# Patient Record
Sex: MALE | Race: WHITE | Employment: FULL TIME | RURAL
[De-identification: names, ages, dates, MRNs, and addresses within clinical notes are randomized per-mention and may not be internally consistent; named-entity substitution may affect disease eponyms.]

---

## 2018-09-12 ENCOUNTER — OFFICE VISIT (OUTPATIENT)
Dept: CARDIOLOGY CLINIC | Age: 28
End: 2018-09-12

## 2018-09-12 VITALS
SYSTOLIC BLOOD PRESSURE: 128 MMHG | OXYGEN SATURATION: 98 % | HEIGHT: 72 IN | BODY MASS INDEX: 34.4 KG/M2 | DIASTOLIC BLOOD PRESSURE: 86 MMHG | RESPIRATION RATE: 18 BRPM | HEART RATE: 74 BPM | WEIGHT: 254 LBS

## 2018-09-12 DIAGNOSIS — Z72.0 TOBACCO USE: ICD-10-CM

## 2018-09-12 DIAGNOSIS — R06.02 SOB (SHORTNESS OF BREATH): ICD-10-CM

## 2018-09-12 DIAGNOSIS — Z82.49 FH: CAD (CORONARY ARTERY DISEASE): Primary | ICD-10-CM

## 2018-09-12 NOTE — PROGRESS NOTES
Jimbo Orr is a 34 y.o. male is here requesting cardiac evaluation. +FH early CAD--father had first MI age 29, subsequent problems and eventual CABG, paternal GF wtih heart disease. No hx DM, hypertension, dyslpidemia, RF, heart murmur, valvular, CHF. +tobacco, occas smoking, chews. Physically active, no CV sx or complaints. Seen by PCP 8/27/18 for physical and requested CV evaluation. EKG 8/27 with NSR, normal EKG. Normal physical exam.  Labs 8/27/18 [id number], LDL 68. HDL 42, TG 58, CMP normal (gluc 80, BUN 18, Cr 1.04, K 5.0), CBC normal (Hb 14.2). No CV sx or complaints, physically active. The patient denies chest pain/ shortness of breath, orthopnea, PND, LE edema, palpitations, syncope, presyncope or fatigue. Patient Active Problem List  
 Diagnosis Date Noted  FH: CAD (coronary artery disease) 09/12/2018  Tobacco use 09/12/2018 No primary care provider on file. History reviewed. No pertinent past medical history. History reviewed. No pertinent surgical history. Allergies not on file Family History Problem Relation Age of Onset  Coronary Artery Disease Father 29  
  heart attack age 26  
 Heart Disease Father  Heart Disease Paternal Grandfather Social History Social History  Marital status: SINGLE Spouse name: N/A  
 Number of children: N/A  
 Years of education: N/A Occupational History  Not on file. Social History Main Topics  Smoking status: Not on file  Smokeless tobacco: Not on file  Alcohol use Not on file  Drug use: Not on file  Sexual activity: Not on file Other Topics Concern  Not on file Social History Narrative  No narrative on file No current outpatient prescriptions on file. No current facility-administered medications for this visit. Review of Symptoms: 
 
CONST  No weight change. No fever, chills, sweats ENT No visual changes, URI sx, sore throat CV  See HPI  
 RESP  No cough, or sputum, wheezing. Also see HPI  
GI  No abdominal pain or change in bowel habits. No heartburn or dysphagia. No melena or rectal bleeding.   No dysuria, urgency, frequency, hematuria MSKEL  No joint pain, swelling. No muscle pain. SKIN  No rash or lesions. NEURO  No headache, syncope, or seizure. No weakness, loss of sensation, or paresthesias. PSYCH  No low mood or depression No anxiety. HE/LYMPH  No easy bruising, abnormal bleeding, or enlarged glands. Physical ExamPhysical Exam:   
There were no vitals taken for this visit. Gen: NAD HEENT:  PERRL, throat clear Neck: no adenopathy, no thyromegaly, no JVD Heart:  Regular,Nl S1S2,  no murmur, gallop or rub.  
Lungs:  clear Abdomen:   Soft, non-tender, bowel sounds are active.  
Extremities:  No edema Pulse: symmetric Neuro: A&O times 3, No focal neuro deficits Cardiographics ECG: normal EKG, normal sinus rhythm, unchanged from previous tracings from outside office Assessment:  
  
  
Patient Active Problem List  
 Diagnosis Date Noted  FH: CAD (coronary artery disease) 09/12/2018  Tobacco use 09/12/2018  
 
34 y.o. male is here requesting cardiac evaluation. +FH early CAD--father had first MI age 29, subsequent problems and eventual CABG, paternal GF wtih heart disease. No hx DM, hypertension, dyslpidemia, RF, heart murmur, valvular, CHF. +tobacco, occas smoking, chews. Physically active, no CV sx or complaints. Seen by PCP 8/27/18 for physical and requested CV evaluation--was told he should see Cardiology after age 27, this is his first evaluation. EKG 8/27 with NSR, normal EKG. Normal physical exam (/80, previous 118/74). Labs 8/27/18 [id number], LDL 68. HDL 42, TG 58, CMP normal (gluc 80, BUN 18, Cr 1.04, K 5.0), CBC normal (Hb 14.2). No CV sx or complaints, physically active.   
 
 Plan:  
 
Lengthy discussion about CAD, risks, plaque development, etc 
 Recommend Heart Scan--EBCT calcium scoring to r/o any CAD--call w/ results (if \"0\" ok without further testing) RF modification--nicotine, limit ETOH Labs reviewed, diet Karla Ramos MD

## 2018-09-12 NOTE — MR AVS SNAPSHOT
[address] [place] [id number] [phone] Patient: Janna Ordaz MRN: [id number] ANGELICA:[birth year] Visit Information Date & Time Provider Department Dept. Phone Encounter #  
 9/12/2018  1:20 PM Ulysses Munmorgan, [address] [facility] [phone] Upcoming Health Maintenance Date Due DTaP/Tdap/Td series (1 - Tdap) 7/23/2013 Influenza Age 5 to Adult 8/1/2018 Allergies as of 9/12/2018  Review Complete On: 9/12/2018 By: Wisam Seaman LPN No Known Allergies Current Immunizations  Never Reviewed No immunizations on file. Not reviewed this visit You Were Diagnosed With   
  
 Codes Comments FH: CAD (coronary artery disease)    -  Primary ICD-10-CM: Z82.49 
ICD-9-CM: V17.3 SOB (shortness of breath)     ICD-10-CM: R06.02 
ICD-9-CM: 786.05 Tobacco use     ICD-10-CM: Z72.0 ICD-9-CM: 305.1 Vitals BP Pulse Resp Height(growth percentile) Weight(growth percentile) SpO2  
 128/86 (BP 1 Location: Right arm, BP Patient Position: Sitting) 74 18 6' (1.829 m) 254 lb (115.2 kg) 98% BMI Smoking Status 34.45 kg/m2 Current Every Day Smoker Vitals History BMI and BSA Data Body Mass Index Body Surface Area 34.45 kg/m 2 2.42 m 2 Preferred Pharmacy Pharmacy Name Phone [address] [phone] Your Updated Medication List  
  
   
This list is accurate as of 9/12/18  2:20 PM.  Always use your most recent med list.  
  
  
  
  
 OTHER Mr. Chastity Sharp osiris workout - three times a week. We Performed the Following AMB POC EKG ROUTINE W/ 12 LEADS, INTER & REP [04242 CPT(R)] To-Do List   
 Around 09/17/2018 Imaging:  CT HEART W/O CONT WITH CALCIUM Introducing [facility]! [facility] introduces Quirky patient portal. Now you can access parts of your medical record, email your doctor's office, and request medication refills online. 1. In your internet browser, go to [email or web address] 2. Click on the First Time User? Click Here link in the Sign In box. You will see the New Member Sign Up page. 3. Enter your Quirky Access Code exactly as it appears below. You will not need to use this code after youve completed the sign-up process. If you do not sign up before the expiration date, you must request a new code. · Quirky Access Code: [email or web address] Expires: 12/11/2018  2:19 PM 
 
4. Enter the last four digits of your Social Security Number (xxxx) and Date of Birth (mm/dd/yyyy) as indicated and click Submit. You will be taken to the next sign-up page. 5. Create a Quirky ID. This will be your Quirky login ID and cannot be changed, so think of one that is secure and easy to remember. 6. Create a Quirky password. You can change your password at any time. 7. Enter your Password Reset Question and Answer. This can be used at a later time if you forget your password. 8. Enter your e-mail address. You will receive e-mail notification when new information is available in [address] 9. Click Sign Up. You can now view and download portions of your medical record. 10. Click the Download Summary menu link to download a portable copy of your medical information. If you have questions, please visit the Frequently Asked Questions section of the Quirky website. Remember, Quirky is NOT to be used for urgent needs. For medical emergencies, dial 911. Now available from your iPhone and Android! Please provide this summary of care documentation to your next provider. Your primary care clinician is listed as Ofelia Jaffe. If you have any questions after today's visit, please call [phone].

## 2018-09-12 NOTE — PROGRESS NOTES
Verified patient with two patient identifiers. Medications reviewed/approved by Dr. Jo-Ann Gutierrez. Chief Complaint Patient presents with [facility] New patient evaluation - FH of CAD 1. Have you been to the ER, urgent care clinic since your last visit? Hospitalized since your last visit? New pt. 
2. Have you seen or consulted any other health care providers outside of the [facility] since your last visit? Include any pap smears or colon screening. New pt.

## 2020-06-19 ENCOUNTER — TELEPHONE (OUTPATIENT)
Dept: CARDIOLOGY CLINIC | Age: 30
End: 2020-06-19

## 2020-06-19 NOTE — TELEPHONE ENCOUNTER
Future Appointments      Provider Kaden Knight   6/22/2020 3:40 PM Sharlotte Apley, Friddie Limbo, NP [place]   6/29/2020 9:40 AM Sade Mathews MD [address] [facility] [address] [facility] with pts mother, Mary Goode. Verified patient with two patient identifiers. PSR recommended heart monitor. Mother is now requesting. Advised that I will speak with Herminia Cutler MD once back into the office on Monday. Pt hasn't been seen since 9/12/2018. Pts mother verbalized understanding. C/o intermittent episodes of dizziness, headache, sweating profusely, elevated BP x4.    Pt never had CT HEART W/O CONT WITH CALCIUM [[id number]] (Order [id number]) that was ordered in 2018.

## 2020-06-29 ENCOUNTER — OFFICE VISIT (OUTPATIENT)
Dept: CARDIOLOGY CLINIC | Age: 30
End: 2020-06-29

## 2020-06-29 VITALS
HEART RATE: 80 BPM | DIASTOLIC BLOOD PRESSURE: 90 MMHG | WEIGHT: 265 LBS | HEIGHT: 72 IN | BODY MASS INDEX: 35.89 KG/M2 | OXYGEN SATURATION: 99 % | TEMPERATURE: 98.6 F | RESPIRATION RATE: 16 BRPM | SYSTOLIC BLOOD PRESSURE: 110 MMHG

## 2020-06-29 DIAGNOSIS — E66.01 SEVERE OBESITY (HCC): ICD-10-CM

## 2020-06-29 DIAGNOSIS — R11.0 NAUSEA: ICD-10-CM

## 2020-06-29 DIAGNOSIS — R61 DIAPHORESIS: ICD-10-CM

## 2020-06-29 DIAGNOSIS — Z82.49 FH: CAD (CORONARY ARTERY DISEASE): ICD-10-CM

## 2020-06-29 DIAGNOSIS — I10 ESSENTIAL HYPERTENSION: Primary | ICD-10-CM

## 2020-06-29 NOTE — PROGRESS NOTES
Bella Santos is a 34 y.o. male is here for symptom-based f/u--elevated BP/hypertension (recently started on ACEI by PCP), episodes of sweating, palpitations, nausea, dizziness. Hx obesity, FH CAD at early age--father had first MI age 29, subsequent problems and eventual CABG, paternal GF wtih heart disease. No hx DM, hypertension, dyslpidemia, RF, heart murmur, valvular, CHF. Smokes1/2 PPD, drinks on weekends, daily caffeine use. Recent labs with PCP--CBC, CMP, thryoids, lipids ok  Except bordeline elevated TSH. The patient denies chest pain/ shortness of breath, orthopnea, PND, LE edema,  syncope, presyncope or fatigue. Patient Active Problem List    Diagnosis Date Noted    Severe obesity ([facility] [address] 06/29/2020    Essential hypertension 06/29/2020    FH: CAD (coronary artery disease) 09/12/2018    Tobacco use 09/12/2018      Yan Frias NP  No past medical history on file. No past surgical history on file.   No Known Allergies   Family History   Problem Relation Age of Onset    Coronary Artery Disease Father 29        heart attack age 30    Heart Disease Father     Heart Disease Paternal Grandfather       Social History     Socioeconomic History    Marital status: SINGLE     Spouse name: Not on file    Number of children: Not on file    Years of education: Not on file    Highest education level: Not on file   Occupational History    Not on file   Social Needs    Financial resource strain: Not on file    Food insecurity     Worry: Not on file     Inability: Not on file    Transportation needs     Medical: Not on file     Non-medical: Not on file   Tobacco Use    Smoking status: Current Every Day Smoker     Types: Cigarettes    Smokeless tobacco: Current User     Types: Chew   Substance and Sexual Activity    Alcohol use: Not on file    Drug use: Not on file    Sexual activity: Not on file   Lifestyle    Physical activity     Days per week: Not on file     Minutes per session: Not on file    Stress: Not on file   Relationships    Social connections     Talks on phone: Not on file     Gets together: Not on file     Attends Mandaen service: Not on file     Active member of club or organization: Not on file     Attends meetings of clubs or organizations: Not on file     Relationship status: Not on file    Intimate partner violence     Fear of current or ex partner: Not on file     Emotionally abused: Not on file     Physically abused: Not on file     Forced sexual activity: Not on file   Other Topics Concern    Not on file   Social History Narrative    Not on file      Current Outpatient Medications   Medication Sig    lisinopriL (PRINIVIL, ZESTRIL) 5 mg tablet Take 1 Tab by mouth daily.  clobetasoL (TEMOVATE) 0.05 % topical cream Apply  to affected area two (2) times a day. No current facility-administered medications for this visit. Review of Symptoms:    CONST  No weight change. No fever, chills, sweats    ENT No visual changes, URI sx, sore throat    CV  See HPI   RESP  No cough, or sputum, wheezing. Also see HPI   GI  No abdominal pain or change in bowel habits. No heartburn or dysphagia. No melena or rectal bleeding.   No dysuria, urgency, frequency, hematuria   MSKEL  No joint pain, swelling. No muscle pain. SKIN  No rash or lesions. NEURO  No headache, syncope, or seizure. No weakness, loss of sensation, or paresthesias. PSYCH  No low mood or depression  No anxiety. HE/LYMPH  No easy bruising, abnormal bleeding, or enlarged glands. Physical ExamPhysical Exam:    Visit Vitals  /90 (BP 1 Location: Left arm, BP Patient Position: Sitting)   Pulse 80   Temp 98.6 °F (37 °C) (Temporal)   Resp 16   Ht 6' (1.829 m)   Wt 265 lb (120.2 kg)   SpO2 99% Comment: ra   BMI 35.94 kg/m²     Gen: NAD  HEENT:  PERRL, throat clear  Neck: no adenopathy, no thyromegaly, no JVD   Heart:  Regular,Nl S1S2,  no murmur, gallop or rub. Lungs:  clear  Abdomen:   Soft, non-tender, bowel sounds are active. Extremities:  No edema  Pulse: symmetric  Neuro: A&O times 3, No focal neuro deficits    Cardiographics    ECG: from 6/22/20--NSR, wnl      Labs:   Lab Results   Component Value Date/Time    Sodium 142 06/22/2020 04:34 PM    Potassium 4.8 06/22/2020 04:34 PM    Chloride 102 06/22/2020 04:34 PM    CO2 23 06/22/2020 04:34 PM    Glucose 86 06/22/2020 04:34 PM    BUN 13 06/22/2020 04:34 PM    Creatinine 1.00 06/22/2020 04:34 PM    BUN/Creatinine ratio 13 06/22/2020 04:34 PM    GFR est  06/22/2020 04:34 PM    GFR est non- 06/22/2020 04:34 PM    Calcium 9.9 06/22/2020 04:34 PM    Bilirubin, total 0.3 06/22/2020 04:34 PM    Alk. phosphatase 58 06/22/2020 04:34 PM    Protein, total 7.3 06/22/2020 04:34 PM    Albumin 5.2 06/22/2020 04:34 PM    A-G Ratio 2.5 (H) 06/22/2020 04:34 PM    ALT (SGPT) 28 06/22/2020 04:34 PM     No results found for: CPK, CPKX, CPX  Lab Results   Component Value Date/Time    Cholesterol, total 154 06/22/2020 04:34 PM    HDL Cholesterol 48 06/22/2020 04:34 PM    LDL, calculated 88 06/22/2020 04:34 PM    Triglyceride 91 06/22/2020 04:34 PM     No results found for this or any previous visit. Assessment:         Patient Active Problem List    Diagnosis Date Noted    Severe obesity ([address] 06/29/2020    Essential hypertension 06/29/2020    FH: CAD (coronary artery disease) 09/12/2018    Tobacco use 09/12/2018      34 y.o. male is here for symptom-based f/u--elevated BP/hypertension (recently started on ACEI by PCP), episodes of sweating, palpitations, nausea, dizziness. Hx obesity, FH CAD at early age--father had first MI age 29, subsequent problems and eventual CABG, paternal GF wtih heart disease. No hx DM, hypertension, dyslpidemia, RF, heart murmur, valvular, CHF. Smokes 1/2 ppd, drinks on weekends, daily caffeine use. Recent labs with PCP--CBC, CMP, thryoids, lipids ok.      Plan:     Stress treadmill EKG  Echo/doppler  Smoking cessation, weight loss, reducing caffeine  Continue lisinopril   Head Ct per PCP  Recheck thyroids as planned    Shoshana Gregory MD

## 2020-06-29 NOTE — PROGRESS NOTES
Verified patient with two patient identifiers. Medications reviewed/approved by Dr. Ileana Hunt. 1. Have you been to the ER, urgent care clinic since your last visit? Hospitalized since your last visit? NO     2. Have you seen or consulted any other health care providers outside of the [address] [place] [address] since your last visit? Include any pap smears or colon screening.  NO

## 2020-08-06 ENCOUNTER — OFFICE VISIT (OUTPATIENT)
Dept: PRIMARY CARE CLINIC | Age: 30
End: 2020-08-06

## 2020-08-06 DIAGNOSIS — Z01.812 PRE-PROCEDURAL LABORATORY EXAMINATIONS: Primary | ICD-10-CM

## 2020-08-06 NOTE — PROGRESS NOTES
Pt is having a procedure next week. Doctor is requesting a covid test prior to procedure. Pt denies sx or exposure at this time. Pt declined to see a doctor today.  KT

## 2020-08-08 LAB — SARS-COV-2, NAA: NOT DETECTED

## 2020-09-14 ENCOUNTER — OFFICE VISIT (OUTPATIENT)
Dept: PRIMARY CARE CLINIC | Age: 30
End: 2020-09-14

## 2020-09-14 DIAGNOSIS — Z01.812 BLOOD TESTS PRIOR TO TREATMENT OR PROCEDURE: Primary | ICD-10-CM

## 2020-09-14 DIAGNOSIS — Z01.812 PRE-PROCEDURAL LABORATORY EXAMINATION: ICD-10-CM

## 2020-09-17 LAB — SARS-COV-2, NAA: NOT DETECTED

## 2020-09-21 ENCOUNTER — PATIENT MESSAGE (OUTPATIENT)
Dept: CARDIOLOGY CLINIC | Age: 30
End: 2020-09-21

## 2022-03-18 PROBLEM — I10 ESSENTIAL HYPERTENSION: Status: ACTIVE | Noted: 2020-06-29

## 2022-03-19 PROBLEM — Z72.0 TOBACCO USE: Status: ACTIVE | Noted: 2018-09-12

## 2022-03-19 PROBLEM — E66.01 SEVERE OBESITY: Status: ACTIVE | Noted: 2020-06-29

## 2022-03-20 PROBLEM — Z82.49 FH: CAD (CORONARY ARTERY DISEASE): Status: ACTIVE | Noted: 2018-09-12

## 2025-03-25 ENCOUNTER — OFFICE VISIT (OUTPATIENT)
Age: 35
End: 2025-03-25
Payer: COMMERCIAL

## 2025-03-25 VITALS
WEIGHT: 256.6 LBS | TEMPERATURE: 98.9 F | DIASTOLIC BLOOD PRESSURE: 68 MMHG | RESPIRATION RATE: 20 BRPM | OXYGEN SATURATION: 98 % | SYSTOLIC BLOOD PRESSURE: 120 MMHG | HEART RATE: 71 BPM | HEIGHT: 72 IN | BODY MASS INDEX: 34.75 KG/M2

## 2025-03-25 DIAGNOSIS — Z00.00 WELLNESS EXAMINATION: Primary | ICD-10-CM

## 2025-03-25 DIAGNOSIS — Z13.1 ENCOUNTER FOR SCREENING EXAMINATION FOR IMPAIRED GLUCOSE REGULATION AND DIABETES MELLITUS: ICD-10-CM

## 2025-03-25 DIAGNOSIS — Z11.59 SCREENING FOR VIRAL DISEASE: ICD-10-CM

## 2025-03-25 DIAGNOSIS — Z13.220 SCREENING, LIPID: ICD-10-CM

## 2025-03-25 DIAGNOSIS — M72.2 PLANTAR FASCIITIS OF LEFT FOOT: ICD-10-CM

## 2025-03-25 DIAGNOSIS — R20.2 PARESTHESIA OF LEFT FOOT: ICD-10-CM

## 2025-03-25 DIAGNOSIS — E55.9 VITAMIN D DEFICIENCY: ICD-10-CM

## 2025-03-25 PROCEDURE — 3074F SYST BP LT 130 MM HG: CPT | Performed by: NURSE PRACTITIONER

## 2025-03-25 PROCEDURE — 36415 COLL VENOUS BLD VENIPUNCTURE: CPT | Performed by: NURSE PRACTITIONER

## 2025-03-25 PROCEDURE — 3078F DIAST BP <80 MM HG: CPT | Performed by: NURSE PRACTITIONER

## 2025-03-25 PROCEDURE — 99385 PREV VISIT NEW AGE 18-39: CPT | Performed by: NURSE PRACTITIONER

## 2025-03-25 ASSESSMENT — PATIENT HEALTH QUESTIONNAIRE - PHQ9
SUM OF ALL RESPONSES TO PHQ QUESTIONS 1-9: 0
SUM OF ALL RESPONSES TO PHQ QUESTIONS 1-9: 0
2. FEELING DOWN, DEPRESSED OR HOPELESS: NOT AT ALL
SUM OF ALL RESPONSES TO PHQ QUESTIONS 1-9: 0
SUM OF ALL RESPONSES TO PHQ QUESTIONS 1-9: 0
1. LITTLE INTEREST OR PLEASURE IN DOING THINGS: NOT AT ALL

## 2025-03-25 NOTE — PROGRESS NOTES
Chief Complaint   Patient presents with    Annual Exam       ASSESSMENT AND PLAN:       1. Wellness examination    - External Referral To Podiatry  - COLLECTION VENOUS BLOOD,VENIPUNCTURE  - CBC with Auto Differential; Future  - Comprehensive Metabolic Panel; Future  - Lipid Panel; Future  - Vitamin D 25 Hydroxy; Future  - Vitamin B12 & Folate; Future  - TSH; Future  - Hemoglobin A1C; Future  - Hepatitis C Antibody; Future  - HIV 1/2 Ag/Ab, 4TH Generation,W Rflx Confirm; Future  - Magnesium; Future  - Magnesium  - HIV 1/2 Ag/Ab, 4TH Generation,W Rflx Confirm  - Hepatitis C Antibody  - Hemoglobin A1C  - TSH  - Vitamin B12 & Folate  - Vitamin D 25 Hydroxy  - Lipid Panel  - Comprehensive Metabolic Panel  - CBC with Auto Differential    2. Screening, lipid      3. Encounter for screening examination for impaired glucose regulation and diabetes mellitus      4. Plantar fasciitis of left foot    - External Referral To Podiatry    5. Screening for viral disease    - COLLECTION VENOUS BLOOD,VENIPUNCTURE  - Hepatitis C Antibody; Future  - HIV 1/2 Ag/Ab, 4TH Generation,W Rflx Confirm; Future  - HIV 1/2 Ag/Ab, 4TH Generation,W Rflx Confirm  - Hepatitis C Antibody    6. Paresthesia of left foot    - Vitamin B12 & Folate; Future  - TSH; Future  - Magnesium; Future  - Magnesium  - TSH  - Vitamin B12 & Folate    7. Vitamin D deficiency    - COLLECTION VENOUS BLOOD,VENIPUNCTURE  - Vitamin D 25 Hydroxy; Future  - Vitamin D 25 Hydroxy    Check up labs today. Discussed BMI. Encouraged continue weight loss, strength training. Discussed L foot pain, numbness, tingling. I highly suspect standing on the concrete floors is exacerbating his symptoms. He will follow up with podiatry regarding inserts. Stretches provided.      Patient aware of plan of care and verbalized understanding. Questions answered. RTC yearly, or sooner if needed.    HPI:     is a 34 y.o. male in today for a wellness exam. He stays busy, working full time at

## 2025-03-26 LAB
25(OH)D3 SERPL-MCNC: 39.2 NG/ML (ref 30–100)
ALBUMIN SERPL-MCNC: 4.8 G/DL (ref 3.5–5)
ALBUMIN/GLOB SERPL: 1.8 (ref 1.1–2.2)
ALP SERPL-CCNC: 69 U/L (ref 45–117)
ALT SERPL-CCNC: 30 U/L (ref 12–78)
ANION GAP SERPL CALC-SCNC: 9 MMOL/L (ref 2–12)
AST SERPL-CCNC: 20 U/L (ref 15–37)
BASOPHILS # BLD: 0.04 K/UL (ref 0–0.1)
BASOPHILS NFR BLD: 0.6 % (ref 0–1)
BILIRUB SERPL-MCNC: 0.6 MG/DL (ref 0.2–1)
BUN SERPL-MCNC: 16 MG/DL (ref 6–20)
BUN/CREAT SERPL: 17 (ref 12–20)
CALCIUM SERPL-MCNC: 9.7 MG/DL (ref 8.5–10.1)
CHLORIDE SERPL-SCNC: 104 MMOL/L (ref 97–108)
CHOLEST SERPL-MCNC: 133 MG/DL
CO2 SERPL-SCNC: 24 MMOL/L (ref 21–32)
CREAT SERPL-MCNC: 0.94 MG/DL (ref 0.7–1.3)
DIFFERENTIAL METHOD BLD: NORMAL
EOSINOPHIL # BLD: 0.07 K/UL (ref 0–0.4)
EOSINOPHIL NFR BLD: 1.1 % (ref 0–7)
ERYTHROCYTE [DISTWIDTH] IN BLOOD BY AUTOMATED COUNT: 12.6 % (ref 11.5–14.5)
EST. AVERAGE GLUCOSE BLD GHB EST-MCNC: 97 MG/DL
FOLATE SERPL-MCNC: 10.7 NG/ML (ref 5–21)
GLOBULIN SER CALC-MCNC: 2.6 G/DL (ref 2–4)
GLUCOSE SERPL-MCNC: 85 MG/DL (ref 65–100)
HBA1C MFR BLD: 5 % (ref 4–5.6)
HCT VFR BLD AUTO: 42.6 % (ref 36.6–50.3)
HCV AB SER IA-ACNC: 0.1 INDEX
HCV AB SERPL QL IA: NONREACTIVE
HDLC SERPL-MCNC: 42 MG/DL
HDLC SERPL: 3.2 (ref 0–5)
HGB BLD-MCNC: 14.2 G/DL (ref 12.1–17)
HIV 1+2 AB+HIV1 P24 AG SERPL QL IA: NONREACTIVE
HIV 1/2 RESULT COMMENT: NORMAL
IMM GRANULOCYTES # BLD AUTO: 0.01 K/UL (ref 0–0.04)
IMM GRANULOCYTES NFR BLD AUTO: 0.2 % (ref 0–0.5)
LDLC SERPL CALC-MCNC: 76.6 MG/DL (ref 0–100)
LYMPHOCYTES # BLD: 1.68 K/UL (ref 0.8–3.5)
LYMPHOCYTES NFR BLD: 26.1 % (ref 12–49)
MAGNESIUM SERPL-MCNC: 2.1 MG/DL (ref 1.6–2.4)
MCH RBC QN AUTO: 29.3 PG (ref 26–34)
MCHC RBC AUTO-ENTMCNC: 33.3 G/DL (ref 30–36.5)
MCV RBC AUTO: 87.8 FL (ref 80–99)
MONOCYTES # BLD: 0.48 K/UL (ref 0–1)
MONOCYTES NFR BLD: 7.5 % (ref 5–13)
NEUTS SEG # BLD: 4.15 K/UL (ref 1.8–8)
NEUTS SEG NFR BLD: 64.5 % (ref 32–75)
NRBC # BLD: 0 K/UL (ref 0–0.01)
NRBC BLD-RTO: 0 PER 100 WBC
PLATELET # BLD AUTO: 228 K/UL (ref 150–400)
PMV BLD AUTO: 11.4 FL (ref 8.9–12.9)
POTASSIUM SERPL-SCNC: 4.4 MMOL/L (ref 3.5–5.1)
PROT SERPL-MCNC: 7.4 G/DL (ref 6.4–8.2)
RBC # BLD AUTO: 4.85 M/UL (ref 4.1–5.7)
SODIUM SERPL-SCNC: 137 MMOL/L (ref 136–145)
TRIGL SERPL-MCNC: 72 MG/DL
TSH SERPL DL<=0.05 MIU/L-ACNC: 2.95 UIU/ML (ref 0.36–3.74)
VIT B12 SERPL-MCNC: 544 PG/ML (ref 193–986)
VLDLC SERPL CALC-MCNC: 14.4 MG/DL
WBC # BLD AUTO: 6.4 K/UL (ref 4.1–11.1)

## 2025-03-28 ENCOUNTER — RESULTS FOLLOW-UP (OUTPATIENT)
Age: 35
End: 2025-03-28